# Patient Record
Sex: FEMALE | Race: WHITE | URBAN - METROPOLITAN AREA
[De-identification: names, ages, dates, MRNs, and addresses within clinical notes are randomized per-mention and may not be internally consistent; named-entity substitution may affect disease eponyms.]

---

## 2017-05-22 ENCOUNTER — DOCTOR'S OFFICE (OUTPATIENT)
Dept: URBAN - METROPOLITAN AREA CLINIC 137 | Facility: CLINIC | Age: 57
Setting detail: OPHTHALMOLOGY
End: 2017-05-22
Payer: COMMERCIAL

## 2017-05-22 ENCOUNTER — OPTICAL OFFICE (OUTPATIENT)
Dept: URBAN - METROPOLITAN AREA CLINIC 146 | Facility: CLINIC | Age: 57
Setting detail: OPHTHALMOLOGY
End: 2017-05-22
Payer: COMMERCIAL

## 2017-05-22 DIAGNOSIS — H52.4: ICD-10-CM

## 2017-05-22 DIAGNOSIS — H52.223: ICD-10-CM

## 2017-05-22 PROCEDURE — V2744 TINT PHOTOCHROMATIC LENS/ES: HCPCS | Performed by: OPHTHALMOLOGY

## 2017-05-22 PROCEDURE — V2203 LENS SPHCYL BIFOCAL 4.00D/.1: HCPCS | Performed by: OPHTHALMOLOGY

## 2017-05-22 PROCEDURE — V2020 VISION SVCS FRAMES PURCHASES: HCPCS | Performed by: OPHTHALMOLOGY

## 2017-05-22 PROCEDURE — 92004 COMPRE OPH EXAM NEW PT 1/>: CPT | Performed by: OPHTHALMOLOGY

## 2017-05-22 ASSESSMENT — REFRACTION_CURRENTRX
OS_OVR_VA: 20/
OS_OVR_VA: 20/
OS_ADD: +2.50
OD_AXIS: 45
OD_OVR_VA: 20/
OS_VPRISM_DIRECTION: BF
OS_SPHERE: +2.50
OS_AXIS: 135
OD_OVR_VA: 20/
OD_VPRISM_DIRECTION: BF
OD_CYLINDER: +1.50
OS_OVR_VA: 20/
OD_ADD: +2.50
OD_OVR_VA: 20/
OS_CYLINDER: +0.50
OD_SPHERE: +3.00

## 2017-05-22 ASSESSMENT — REFRACTION_MANIFEST
OD_VA2: 20/
OS_VA3: 20/
OS_VA2: 20/
OD_VA2: 20/
OS_VA1: 20/
OD_VA3: 20/
OD_VA1: 20/
OD_VA1: 20/
OS_VA3: 20/
OS_VA1: 20/
OD_VA3: 20/
OS_VA2: 20/
OU_VA: 20/
OU_VA: 20/

## 2017-05-22 ASSESSMENT — REFRACTION_OUTSIDERX
OS_ADD: +2.25
OD_VA2: 20/20(J1+)
OD_CYLINDER: +2.00
OD_SPHERE: +3.00
OS_VA2: 20/20(J1+)
OS_AXIS: 140
OD_ADD: +2.25
OD_VA3: 20/
OS_VA1: 20/20
OU_VA: 20/
OS_VA3: 20/
OD_AXIS: 45
OS_CYLINDER: +0.50
OS_SPHERE: +2.50
OD_VA1: 20/20

## 2017-05-22 ASSESSMENT — VISUAL ACUITY
OS_BCVA: 20/20-2
OD_BCVA: 20/20

## 2017-05-22 ASSESSMENT — REFRACTION_AUTOREFRACTION
OD_SPHERE: +3.00
OD_AXIS: 46
OS_SPHERE: +2.50
OD_CYLINDER: +2.00
OS_CYLINDER: +0.50
OS_AXIS: 143

## 2017-05-22 ASSESSMENT — SPHEQUIV_DERIVED
OS_SPHEQUIV: 2.75
OD_SPHEQUIV: 4

## 2017-05-22 ASSESSMENT — CONFRONTATIONAL VISUAL FIELD TEST (CVF)
OS_FINDINGS: FULL
OD_FINDINGS: FULL

## 2018-06-13 ENCOUNTER — OPTICAL OFFICE (OUTPATIENT)
Dept: URBAN - METROPOLITAN AREA CLINIC 146 | Facility: CLINIC | Age: 58
Setting detail: OPHTHALMOLOGY
End: 2018-06-13
Payer: COMMERCIAL

## 2018-06-13 ENCOUNTER — DOCTOR'S OFFICE (OUTPATIENT)
Dept: URBAN - METROPOLITAN AREA CLINIC 137 | Facility: CLINIC | Age: 58
Setting detail: OPHTHALMOLOGY
End: 2018-06-13
Payer: COMMERCIAL

## 2018-06-13 DIAGNOSIS — H52.223: ICD-10-CM

## 2018-06-13 DIAGNOSIS — H52.4: ICD-10-CM

## 2018-06-13 PROBLEM — H25.13 CATARACT NUCLEAR SCLEROSIS; BOTH EYES: Status: ACTIVE | Noted: 2018-06-13

## 2018-06-13 PROCEDURE — V2744 TINT PHOTOCHROMATIC LENS/ES: HCPCS | Performed by: OPHTHALMOLOGY

## 2018-06-13 PROCEDURE — V2203 LENS SPHCYL BIFOCAL 4.00D/.1: HCPCS | Performed by: OPHTHALMOLOGY

## 2018-06-13 PROCEDURE — 92014 COMPRE OPH EXAM EST PT 1/>: CPT | Performed by: OPHTHALMOLOGY

## 2018-06-13 PROCEDURE — V2020 VISION SVCS FRAMES PURCHASES: HCPCS | Performed by: OPHTHALMOLOGY

## 2018-06-13 ASSESSMENT — REFRACTION_OUTSIDERX
OS_VA2: 20/20(J1+)
OS_VA1: 20/20
OD_VA1: 20/20
OS_VA2: 20/20
OS_VA3: 20/
OD_AXIS: 040
OD_SPHERE: +3.00
OS_AXIS: 140
OS_ADD: +2.25
OD_VA3: 20/
OS_VA1: 20/20
OS_SPHERE: +2.50
OD_CYLINDER: +2.00
OS_CYLINDER: +0.50
OD_ADD: +2.25
OD_SPHERE: +3.00
OD_VA1: 20/20
OU_VA: 20/
OS_AXIS: 140
OS_CYLINDER: +0.50
OS_VA3: 20/
OD_ADD: +2.00
OS_SPHERE: +2.50
OD_AXIS: 45
OU_VA: 20/20
OD_VA2: 20/20(J1+)
OD_CYLINDER: +0.20
OS_ADD: +2.00
OD_VA2: 20/20
OD_VA3: 20/

## 2018-06-13 ASSESSMENT — REFRACTION_MANIFEST
OS_VA3: 20/
OD_VA1: 20/
OU_VA: 20/
OD_VA3: 20/
OD_VA2: 20/
OS_VA2: 20/
OS_VA1: 20/

## 2018-06-13 ASSESSMENT — CONFRONTATIONAL VISUAL FIELD TEST (CVF)
OS_FINDINGS: FULL
OD_FINDINGS: FULL

## 2018-06-14 ASSESSMENT — SPHEQUIV_DERIVED
OS_SPHEQUIV: 2.75
OD_SPHEQUIV: 4

## 2018-06-14 ASSESSMENT — REFRACTION_CURRENTRX
OS_OVR_VA: 20/
OS_OVR_VA: 20/
OD_OVR_VA: 20/
OS_VPRISM_DIRECTION: BF
OD_SPHERE: +3.00
OD_ADD: +2.50
OS_OVR_VA: 20/
OD_OVR_VA: 20/
OD_OVR_VA: 20/
OS_AXIS: 135
OD_VPRISM_DIRECTION: BF
OD_AXIS: 45
OS_SPHERE: +2.50
OS_ADD: +2.50
OS_CYLINDER: +0.50
OD_CYLINDER: +1.50

## 2018-06-14 ASSESSMENT — REFRACTION_AUTOREFRACTION
OD_SPHERE: +3.00
OS_CYLINDER: +0.50
OD_CYLINDER: +2.00
OS_SPHERE: +2.50
OS_AXIS: 142
OD_AXIS: 042

## 2018-06-14 ASSESSMENT — VISUAL ACUITY
OS_BCVA: 20/20
OD_BCVA: 20/20-1

## 2019-08-28 ENCOUNTER — TELEPHONE (OUTPATIENT)
Dept: FAMILY MEDICINE CLINIC | Facility: CLINIC | Age: 59
End: 2019-08-28

## 2019-08-28 ENCOUNTER — OFFICE VISIT (OUTPATIENT)
Dept: FAMILY MEDICINE CLINIC | Facility: CLINIC | Age: 59
End: 2019-08-28
Payer: COMMERCIAL

## 2019-08-28 VITALS
TEMPERATURE: 97.5 F | RESPIRATION RATE: 18 BRPM | SYSTOLIC BLOOD PRESSURE: 142 MMHG | WEIGHT: 233.2 LBS | DIASTOLIC BLOOD PRESSURE: 82 MMHG | BODY MASS INDEX: 36.52 KG/M2 | HEART RATE: 92 BPM

## 2019-08-28 DIAGNOSIS — Z13.1 SCREENING FOR DIABETES MELLITUS: ICD-10-CM

## 2019-08-28 DIAGNOSIS — H72.92 PERFORATION OF LEFT TYMPANIC MEMBRANE: Primary | ICD-10-CM

## 2019-08-28 DIAGNOSIS — Z13.220 SCREENING FOR LIPOID DISORDERS: ICD-10-CM

## 2019-08-28 PROBLEM — Z00.00 HEALTHCARE MAINTENANCE: Status: ACTIVE | Noted: 2019-08-28

## 2019-08-28 PROBLEM — H72.90 PERFORATED TYMPANIC MEMBRANE: Status: ACTIVE | Noted: 2019-08-28

## 2019-08-28 PROCEDURE — 3725F SCREEN DEPRESSION PERFORMED: CPT | Performed by: FAMILY MEDICINE

## 2019-08-28 PROCEDURE — 99214 OFFICE O/P EST MOD 30 MIN: CPT | Performed by: FAMILY MEDICINE

## 2019-08-28 PROCEDURE — 4004F PT TOBACCO SCREEN RCVD TLK: CPT | Performed by: FAMILY MEDICINE

## 2019-08-28 RX ORDER — ALBUTEROL SULFATE 90 UG/1
2 AEROSOL, METERED RESPIRATORY (INHALATION) EVERY 4 HOURS
Refills: 0 | COMMUNITY
Start: 2019-08-13

## 2019-08-28 RX ORDER — NAPROXEN 500 MG/1
500 TABLET ORAL 2 TIMES DAILY WITH MEALS
Qty: 14 TABLET | Refills: 0 | Status: SHIPPED | OUTPATIENT
Start: 2019-08-28 | End: 2020-07-06

## 2019-08-28 RX ORDER — OXYCODONE HYDROCHLORIDE AND ACETAMINOPHEN 5; 325 MG/1; MG/1
TABLET ORAL
Refills: 0 | COMMUNITY
Start: 2019-08-24 | End: 2020-07-06

## 2019-08-28 RX ORDER — AMOXICILLIN 500 MG/1
500 TABLET, FILM COATED ORAL 3 TIMES DAILY
Refills: 0 | COMMUNITY
Start: 2019-08-24 | End: 2020-07-06

## 2019-08-28 RX ORDER — NAPROXEN 500 MG/1
500 TABLET ORAL 2 TIMES DAILY WITH MEALS
Qty: 14 TABLET | Refills: 0 | Status: SHIPPED | OUTPATIENT
Start: 2019-08-28 | End: 2019-08-28 | Stop reason: SDUPTHER

## 2019-08-28 RX ORDER — DIAZEPAM 10 MG/1
TABLET ORAL
Refills: 0 | COMMUNITY
Start: 2019-08-04 | End: 2020-07-06

## 2019-08-28 RX ORDER — TRAMADOL HYDROCHLORIDE 50 MG/1
50 TABLET ORAL EVERY 6 HOURS
Refills: 0 | COMMUNITY
Start: 2019-08-20 | End: 2020-07-06

## 2019-08-28 NOTE — TELEPHONE ENCOUNTER
Patient called office stating she is not satisfied with medication for inflammation  Patient stated she is having Tramadol filled by [facility] and that she will be going to [facility] ER for the pain
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
## 2019-08-28 NOTE — ASSESSMENT & PLAN NOTE
Left tympanic membrane ruptured  Would like to have refill of her oxycodone and diazepam      Discussed option of using a steroid to help with the inflammation seen on otoscopy  States she does not want prednisone because it made her sick last time  Discussed using Naproxen 500mg and follow up with ENT regarding repair  Patient refused naproxen because she says it will not do anything  Advised patient to fill the prescription and follow up with ENT since narcotics should not be used in her treatment today  Naproxen 500mg and ENT referral given today      RTC in 2 weeks

## 2019-08-29 NOTE — TELEPHONE ENCOUNTER
Patient called requesting to speak with Dr Chris Carreon or Dr Natasha Sarah  Patient called stating that she wanted to thank the provider for referring her to ENT  Patient stated that she is "doing her homework"  Patient went to ER last night and was given 6 tablets of 5mg Valium  Patient stated that she was given referral in the ER for chronic pain physical therapy which she stated she will call and schedule today  Called [facility] and was given a name for another PCP that she will be establishing with next week as we are not enrolled in [facility] right now, but if she does not like that provider she will be coming back to our office  Patient stated that she is "shopping around" for a provider  Patient states she was "pissed off after her appointment yesterday because she was not given a couple tablets for her muscle spasms"  Patient stated that she does understands that they were only doing their job

## 2019-09-03 ENCOUNTER — TELEPHONE (OUTPATIENT)
Dept: FAMILY MEDICINE CLINIC | Facility: CLINIC | Age: 59
End: 2019-09-03

## 2019-09-03 NOTE — TELEPHONE ENCOUNTER
Spoke with [facility]  They stated that they need to call the ENT office to see if they could add on to their schedule  They will call back if there is an availability

## 2019-09-03 NOTE — TELEPHONE ENCOUNTER
Spoke with Anne From the [facility]  Patient is scheduled 9/12/19 at 140pm  Patient is asked to arrive 15 minutes early and to bring her insurance card and photo ID  Left voicemail for patient informing her of the appointment

## 2019-09-03 NOTE — TELEPHONE ENCOUNTER
Patient called stating that she called the [facility] [address] ENT  Patient stated she called at 10:43am on 9/3 and spoke with Mayco Paniagua  Patient stated she was told earliest visit is Friday December 12th with Dr Lisette Beltran  Patient called to see if there was anywhere else we could refer her to or if we can try to get her in earlier  Please advise  Thank you

## 2019-09-03 NOTE — TELEPHONE ENCOUNTER
Patient called in stated she would like us to fax all paperwork to [facility] attn: Kortney Cazares [facility]#[phone] advised in order for us to release information to this facility will need her to sign medical release form

## 2019-09-09 ENCOUNTER — TRANSCRIBE ORDERS (OUTPATIENT)
Dept: INTERNAL MEDICINE CLINIC | Facility: CLINIC | Age: 59
End: 2019-09-09

## 2019-09-09 DIAGNOSIS — H72.12 TYMPANIC MEMBRANE ATTIC PERFORATION, LEFT: Primary | ICD-10-CM

## 2019-09-11 ENCOUNTER — OFFICE VISIT (OUTPATIENT)
Dept: FAMILY MEDICINE CLINIC | Facility: CLINIC | Age: 59
End: 2019-09-11
Payer: COMMERCIAL

## 2019-09-11 VITALS
WEIGHT: 229.8 LBS | TEMPERATURE: 98.1 F | SYSTOLIC BLOOD PRESSURE: 126 MMHG | HEART RATE: 100 BPM | DIASTOLIC BLOOD PRESSURE: 80 MMHG | BODY MASS INDEX: 35.99 KG/M2

## 2019-09-11 DIAGNOSIS — H92.01 RIGHT EAR PAIN: ICD-10-CM

## 2019-09-11 DIAGNOSIS — F17.200 TOBACCO USE DISORDER: ICD-10-CM

## 2019-09-11 DIAGNOSIS — Z71.6 ENCOUNTER FOR TOBACCO USE CESSATION COUNSELING: Primary | ICD-10-CM

## 2019-09-11 PROBLEM — Z71.1 WORRIED WELL: Status: ACTIVE | Noted: 2019-09-11

## 2019-09-11 PROBLEM — H92.09 EAR PAIN: Status: ACTIVE | Noted: 2019-09-11

## 2019-09-11 PROCEDURE — 99214 OFFICE O/P EST MOD 30 MIN: CPT | Performed by: FAMILY MEDICINE

## 2019-09-11 NOTE — ASSESSMENT & PLAN NOTE
Concerned about exposure to Tuberculosis  Patient had a 5-10 minute conversation with a person who said she had tuberculosis  No symptoms or complaints consistent with TB infection  Advised patient she was not exposed to TB long enough to contract disease and to follow up with PCP if symptoms occur

## 2019-09-11 NOTE — ASSESSMENT & PLAN NOTE
Patient has pruritis and dryness of external right ear  Uses Q-Tips to relieve her symptoms  Patient has appointment with ENT 9/12/2019 for a perforated left tympanic membrane which occurred 8/2019  External ear pruritis vs  Xeroderma of external ear    Advised patient against Q-Tip use, and to present her symptoms to ENT during her visit tomorrow

## 2019-09-11 NOTE — ASSESSMENT & PLAN NOTE
Patient is interested in smoking cessation  States she smokes about a half a pack per day for the past 20 years  Recommended patient to find ways to avoid situations where she may feel the need to use tobacco  Advised against using a vaporizer as an aid to stop smoking  Patient is interested in NRT and is willing to try a patch and gum  Prescribed Nicotine Patch 7mg & Nicotine gum 2mg PRN      RTC in 3 months

## 2019-09-11 NOTE — PROGRESS NOTES
Family Medicine Follow-Up Office Visit  Emmanuel Milan 63 y o  female   MRN: [id number] : 1960  ENCOUNTER: 2019 5:59 PM    Assessment and Plan   Ear pain  Patient has pruritis and dryness of external right ear  Uses Q-Tips to relieve her symptoms  Patient has appointment with ENT 2019 for a perforated left tympanic membrane which occurred 2019  External ear pruritis vs  Xeroderma of external ear    Advised patient against Q-Tip use, and to present her symptoms to ENT during her visit tomorrow  Worried well  Concerned about exposure to Tuberculosis  Patient had a 5-10 minute conversation with a person who said she had tuberculosis  No symptoms or complaints consistent with TB infection  Advised patient she was not exposed to TB long enough to contract disease and to follow up with PCP if symptoms occur  Tobacco use disorder  Patient is interested in smoking cessation  States she smokes about a half a pack per day for the past 20 years  Recommended patient to find ways to avoid situations where she may feel the need to use tobacco  Advised against using a vaporizer as an aid to stop smoking  Patient is interested in NRT and is willing to try a patch and gum  Prescribed Nicotine Patch 7mg & Nicotine gum 2mg PRN  RTC in 3 months      Chief Complaint     Chief Complaint   Patient presents with    Follow-up       History of Present Illness   Emmanuel Milan is a 63y o -year-old female who presents today for right ear pain  States for the past 3 days, she has had itching of the right ear  Uses Q-tips to relieve the itching  Says she also has dryness of the right ear  Has an appoint with ENT regarding a perforated left tympanic membrane on 2019  Patient also states she had a brief conversation with a patient who said she had tuberculosis and is worried about cira the disease, however, she has no symptoms  Also interested in tobacco cessation      Review of Systems Review of Systems   Constitutional: Negative for activity change, appetite change, chills, fatigue and fever  HENT: Positive for ear pain (left sided perforation)  Negative for congestion, facial swelling, sinus pressure and sinus pain  Eyes: Negative for pain, discharge, redness and itching  Respiratory: Negative for apnea, cough, chest tightness, shortness of breath and wheezing  Gastrointestinal: Negative for abdominal distention, abdominal pain, constipation and diarrhea  Genitourinary: Negative for difficulty urinating, dysuria, flank pain and frequency  Neurological: Negative for dizziness, weakness, numbness and headaches  Active Problem List     Patient Active Problem List   Diagnosis    Perforated tympanic membrane    Healthcare maintenance    Ear pain    Tobacco use disorder    Worried well       Past Medical History, Past Surgical History, Family History, and Social History were reviewed and updated today as appropriate  Objective   /80   Pulse 100   Temp 98 1 °F (36 7 °C)   Wt 104 kg (229 lb 12 8 oz)   BMI 35 99 kg/m²     Physical Exam   Constitutional: She appears well-developed and well-nourished  No distress  HENT:   Head: Normocephalic and atraumatic  Right Ear: External ear normal    Left Ear: External ear normal    Nose: Nose normal    Mouth/Throat: Oropharynx is clear and moist    Cardiovascular: Normal rate, regular rhythm, normal heart sounds and intact distal pulses  No murmur heard  Pulmonary/Chest: Effort normal and breath sounds normal  No respiratory distress  She has no wheezes  Abdominal: Soft  She exhibits no distension  There is no tenderness  Skin: Skin is warm and dry  She is not diaphoretic  No erythema  No pallor       Diabetic Foot Exam    Pertinent Laboratory/Diagnostic Studies:  Lab Results   Component Value Date    GLUCOSE 86 05/23/2014    BUN 11 09/22/2016    CREATININE 0 89 09/22/2016    CALCIUM 9 2 09/22/2016     05/23/2014    K 4 0 09/22/2016    CO2 28 09/22/2016     09/22/2016     Lab Results   Component Value Date    ALT 41 09/22/2016    AST 20 09/22/2016    ALKPHOS 125 (H) 09/22/2016    BILITOT 0 3 05/23/2014       Lab Results   Component Value Date    WBC 11 25 (H) 09/22/2016    HGB 16 1 (H) 09/22/2016    HCT 47 1 (H) 09/22/2016    MCV 94 09/22/2016     09/22/2016       No results found for: TSH    Lab Results   Component Value Date    CHOL 195 05/23/2014     Lab Results   Component Value Date    TRIG 148 09/22/2016     Lab Results   Component Value Date    HDL 61 (H) 09/22/2016     Lab Results   Component Value Date    LDLCALC 159 (H) 09/22/2016     No results found for: HGBA1C    Results for orders placed or performed in visit on 09/22/16   CBC and differential   Result Value Ref Range    WBC 11 25 (H) 4 31 - 10 16 Thousand/uL    RBC 5 02 3 81 - 5 12 Million/uL    Hemoglobin 16 1 (H) 11 5 - 15 4 g/dL    Hematocrit 47 1 (H) 34 8 - 46 1 %    MCV 94 82 - 98 fL    MCH 32 1 26 8 - 34 3 pg    MCHC 34 2 31 4 - 37 4 g/dL    RDW 14 3 11 6 - 15 1 %    MPV 9 7 8 9 - 12 7 fL    Platelets 905 452 - 983 Thousands/uL    Neutrophils Relative 70 43 - 75 %    Lymphocytes Relative 21 14 - 44 %    Monocytes Relative 6 4 - 12 %    Eosinophils Relative 3 0 - 6 %    Basophils Relative 0 0 - 1 %    Neutrophils Absolute 7 87 (H) 1 85 - 7 62 Thousands/µL    Lymphocytes Absolute 2 37 0 60 - 4 47 Thousands/µL    Monocytes Absolute 0 66 0 17 - 1 22 Thousand/µL    Eosinophils Absolute 0 33 0 00 - 0 61 Thousand/µL    Basophils Absolute 0 02 0 00 - 0 10 Thousands/µL   Comprehensive metabolic panel   Result Value Ref Range    Sodium 142 136 - 145 mmol/L    Potassium 4 0 3 5 - 5 3 mmol/L    Chloride 104 100 - 108 mmol/L    CO2 28 21 - 32 mmol/L    ANION GAP 10 4 - 13 mmol/L    BUN 11 5 - 25 mg/dL    Creatinine 0 89 0 60 - 1 30 mg/dL    Glucose 98 65 - 140 mg/dL    Calcium 9 2 8 3 - 10 1 mg/dL    AST 20 5 - 45 U/L    ALT 41 12 - 78 U/L Alkaline Phosphatase 125 (H) 46 - 116 U/L    Total Protein 8 2 6 4 - 8 2 g/dL    Albumin 4 1 3 5 - 5 0 g/dL    Total Bilirubin 0 30 0 20 - 1 00 mg/dL    eGFR >60 0 ml/min/1 73sq m   Lipid panel   Result Value Ref Range    Cholesterol 250 (H) 50 - 200 mg/dL    Triglycerides 148 <=150 mg/dL    HDL, Direct 61 (H) 40 - 60 mg/dL    LDL Calculated 159 (H) 0 - 100 mg/dL   TSH, 3rd generation with T4 reflex   Result Value Ref Range    TSH 3RD GENERATON 1 699 0 358 - 3 740 uIU/mL       No orders of the defined types were placed in this encounter  Current Medications     Current Outpatient Medications   Medication Sig Dispense Refill    naproxen (NAPROSYN) 500 mg tablet Take 1 tablet (500 mg total) by mouth 2 (two) times a day with meals 14 tablet 0    albuterol (PROVENTIL HFA,VENTOLIN HFA) 90 mcg/act inhaler Inhale 2 puffs every 4 (four) hours  0    amoxicillin (AMOXIL) 500 MG tablet Take 500 mg by mouth 3 (three) times a day  0    diazepam (VALIUM) 10 mg tablet TK 1 T PO TID  0    nicotine (NICODERM CQ) 7 mg/24hr TD 24 hr patch Place 1 patch on the skin every 24 hours 14 patch 2    nicotine polacrilex (NICORETTE) 2 mg gum Chew 1 each (2 mg total) as needed for smoking cessation 50 each 2    oxyCODONE-acetaminophen (PERCOCET) 5-325 mg per tablet take 1 tablet by mouth every 6 hours if needed for back pain  0    traMADol (ULTRAM) 50 mg tablet Take 50 mg by mouth every 6 (six) hours  0     No current facility-administered medications for this visit          ALLERGIES:  No Known Allergies    Health Maintenance     Health Maintenance   Topic Date Due    Hepatitis C Screening  1960    MAMMOGRAM  1960    CRC Screening: Colonoscopy  1960    Pneumococcal Vaccine: Pediatrics (0 to 5 Years) and At-Risk Patients (6 to 59 Years) (1 of 1 - PPSV23) 09/09/1967    DTaP,Tdap,and Td Vaccines (1 - Tdap) 09/09/1982    PAP SMEAR  09/09/1982    INFLUENZA VACCINE  07/01/2019    Depression Screening PHQ 08/28/2020    BMI: Adult  08/28/2020    Pneumococcal Vaccine: 65+ Years (1 of 2 - PCV13) 09/09/2026    HEPATITIS B VACCINES  Aged Out       There is no immunization history on file for this patient  Alicia Zarate MD   [address]  9/11/2019  5:59 PM    Parts of this note were dictated using [employer] dictation software and may have sounds-like errors due to variation in pronunciation

## 2019-09-12 ENCOUNTER — TELEPHONE (OUTPATIENT)
Dept: FAMILY MEDICINE CLINIC | Facility: CLINIC | Age: 59
End: 2019-09-12

## 2019-09-12 NOTE — TELEPHONE ENCOUNTER
Called patient back stated she just contacted [facility] she was told they will e-mail her a list of providers she is able to see   Advised to call us back with that information and we will forward information to Dr Bernabe Closs to be able to issue new referral

## 2019-09-12 NOTE — TELEPHONE ENCOUNTER
Received phone call from patient stating she cancelled appointment for ENT for 9/12 at 140PM patient stated she does not feel comfortable going to appointment, [facility] would like to see if she is able to see another provider within the network in a regular doctor's office environment  Patient also asked what other medicaid insurances we accept advised we also accept [facility]   Please call patient back at [phone]

## 2019-09-12 NOTE — TELEPHONE ENCOUNTER
Patient should try to contact her insurance company and find out which providers she is able to see   Please try calling patient back at some point today

## 2019-09-13 ENCOUNTER — TELEPHONE (OUTPATIENT)
Dept: FAMILY MEDICINE CLINIC | Facility: CLINIC | Age: 59
End: 2019-09-13

## 2019-09-13 NOTE — TELEPHONE ENCOUNTER
Pharmacy called regarding the prescription for the nicotine polacrilex (nicorette)   Pharmacy requests clear directions for usage

## 2019-09-13 NOTE — TELEPHONE ENCOUNTER
Spoke with pharmacy  Updated prescription to chew one 2mg piece as needed, maximum 5 pieces per day   Dispense box of 50 pieces, 2 refills

## 2019-11-21 DIAGNOSIS — H72.92 PERFORATION OF LEFT TYMPANIC MEMBRANE: ICD-10-CM

## 2019-12-04 RX ORDER — NAPROXEN 500 MG/1
TABLET ORAL
Qty: 14 TABLET | Refills: 0 | Status: SHIPPED | OUTPATIENT
Start: 2019-12-04 | End: 2020-07-06

## 2019-12-04 NOTE — TELEPHONE ENCOUNTER
Please reach out to patient and ask her to go for blood work previously ordered   Refill approved at this time

## 2020-02-20 ENCOUNTER — TELEPHONE (OUTPATIENT)
Dept: FAMILY MEDICINE CLINIC | Facility: CLINIC | Age: 60
End: 2020-02-20

## 2020-02-20 NOTE — TELEPHONE ENCOUNTER
From in preceptor room folder from [place] [employer] and delinqu[employer] to be completed  Thank you

## 2020-03-03 ENCOUNTER — OFFICE VISIT (OUTPATIENT)
Dept: FAMILY MEDICINE CLINIC | Facility: CLINIC | Age: 60
End: 2020-03-03
Payer: COMMERCIAL

## 2020-03-03 VITALS
TEMPERATURE: 97.7 F | SYSTOLIC BLOOD PRESSURE: 122 MMHG | BODY MASS INDEX: 36.56 KG/M2 | HEART RATE: 101 BPM | DIASTOLIC BLOOD PRESSURE: 84 MMHG | OXYGEN SATURATION: 98 % | WEIGHT: 233.4 LBS

## 2020-03-03 DIAGNOSIS — F17.200 TOBACCO USE DISORDER: ICD-10-CM

## 2020-03-03 DIAGNOSIS — G47.00 INSOMNIA, UNSPECIFIED TYPE: ICD-10-CM

## 2020-03-03 DIAGNOSIS — H72.92 PERFORATION OF LEFT TYMPANIC MEMBRANE: Primary | ICD-10-CM

## 2020-03-03 PROCEDURE — 99214 OFFICE O/P EST MOD 30 MIN: CPT | Performed by: FAMILY MEDICINE

## 2020-03-03 PROCEDURE — 4004F PT TOBACCO SCREEN RCVD TLK: CPT | Performed by: FAMILY MEDICINE
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
## 2020-03-03 NOTE — ASSESSMENT & PLAN NOTE
Patient was non compliant with nicotine replacement therapy ordered at last visit  States she does not want to quit at this moment   Maybe in the future    - Will discuss tobacco cessation at next visit    RTC after ENT visit

## 2020-03-04 PROBLEM — G47.00 INSOMNIA: Status: ACTIVE | Noted: 2020-03-04

## 2020-03-04 NOTE — ASSESSMENT & PLAN NOTE
Patient states she has had difficulty sleeping for the past few months  No improvement from tylenol PM  Slight improvement with camomille tea      - Recommended sleep hygiene  - Recommended valerian root to help with sleep

## 2020-03-05 ENCOUNTER — TELEPHONE (OUTPATIENT)
Dept: FAMILY MEDICINE CLINIC | Facility: CLINIC | Age: 60
End: 2020-03-05

## 2020-03-24 ENCOUNTER — TELEPHONE (OUTPATIENT)
Dept: FAMILY MEDICINE CLINIC | Facility: CLINIC | Age: 60
End: 2020-03-24

## 2020-03-24 NOTE — TELEPHONE ENCOUNTER
Do you have a fever of 100 5 and above?:  Do you have a cough?:  Do you have shortness of breath?:    Have you visited [place]?:  [facility]?:  [place]?:  [place]  [place]?:    Have you visited [place]?:  [place]?:  [place]?:    Have you visited [place]:?   [address]?:  [address] [address] [facility]?:  [facility]?:    Have you been exposed to someone who has been diagnosed with coronavirus?:    Do you commute daily to and from [place] or [place] for work?:

## 2020-04-08 ENCOUNTER — TELEPHONE (OUTPATIENT)
Dept: FAMILY MEDICINE CLINIC | Facility: CLINIC | Age: 60
End: 2020-04-08

## 2020-07-06 ENCOUNTER — HOSPITAL ENCOUNTER (EMERGENCY)
Facility: HOSPITAL | Age: 60
Discharge: HOME | End: 2020-07-06
Attending: EMERGENCY MEDICINE
Payer: COMMERCIAL

## 2020-07-06 VITALS
WEIGHT: 234.56 LBS | DIASTOLIC BLOOD PRESSURE: 75 MMHG | BODY MASS INDEX: 36.74 KG/M2 | TEMPERATURE: 98.9 F | SYSTOLIC BLOOD PRESSURE: 142 MMHG | OXYGEN SATURATION: 97 % | RESPIRATION RATE: 16 BRPM | HEART RATE: 99 BPM

## 2020-07-06 DIAGNOSIS — H92.01 RIGHT EAR PAIN: ICD-10-CM

## 2020-07-06 DIAGNOSIS — K08.89 PAIN, DENTAL: Primary | ICD-10-CM

## 2020-07-06 DIAGNOSIS — H92.01 OTALGIA OF RIGHT EAR: ICD-10-CM

## 2020-07-06 PROCEDURE — 99284 EMERGENCY DEPT VISIT MOD MDM: CPT | Performed by: PHYSICIAN ASSISTANT

## 2020-07-06 PROCEDURE — 99282 EMERGENCY DEPT VISIT SF MDM: CPT

## 2020-07-06 RX ORDER — PENICILLIN V POTASSIUM 500 MG/1
1000 TABLET ORAL 2 TIMES DAILY
Qty: 40 TABLET | Refills: 0 | Status: SHIPPED | OUTPATIENT
Start: 2020-07-06 | End: 2020-07-16

## 2020-07-06 RX ORDER — OXYCODONE HYDROCHLORIDE AND ACETAMINOPHEN 5; 325 MG/1; MG/1
1 TABLET ORAL EVERY 6 HOURS PRN
Qty: 12 TABLET | Refills: 0 | Status: SHIPPED | OUTPATIENT
Start: 2020-07-06 | End: 2020-07-09

## 2020-07-06 NOTE — ED PROVIDER NOTES
History  Chief Complaint   Patient presents with    Dental Pain     Right lower wisdom tooth impaction, has not seen dentist due to [place]  Reports taking motrin and tumeric   Earache     Right ear     Patient history of anxiety, depression, hearing loss from left perforated TM, has been going to ENT for ear issues, presents to ED complaining of right lower jaw pain from a impacted wisdom tooth with radiation into the ear that she is concerned she may have an ear infection  Patient has been using the drops that she was using for the left ear, no fever, no discharge, no facial swelling, no vomiting  Patient states she tried calling dentist but there was a wait list to came to emergency department, because they said they would not see her until the infection is cleared          Prior to Admission Medications   Prescriptions Last Dose Informant Patient Reported? Taking? albuterol (PROVENTIL HFA,VENTOLIN HFA) 90 mcg/act inhaler  Self Yes No   Sig: Inhale 2 puffs every 4 (four) hours   nicotine (NICODERM CQ) 7 mg/24hr TD 24 hr patch   No No   Sig: Place 1 patch on the skin every 24 hours      Facility-Administered Medications: None       Past Medical History:   Diagnosis Date    Anxiety     Depression     HL (hearing loss)        Past Surgical History:   Procedure Laterality Date     SECTION      DENTAL SURGERY N/A /       Family History   Problem Relation Age of Onset    Diabetes Mother     Hypertension Mother     Cancer Father     Diabetes Father     Hypertension Father     Hypertension Family     Cancer Family     Mental illness Family      I have reviewed and agree with the history as documented      E-Cigarette/Vaping     E-Cigarette/Vaping Substances     Social History     Tobacco Use    Smoking status: Current Every Day Smoker     Packs/day: 1 00     Years: 20 00     Pack years: 20 00     Types: Cigarettes    Smokeless tobacco: Current User    Tobacco comment: Patient states she has been smoking on and off for 20 years   Substance Use Topics    Alcohol use: Not Currently     Frequency: Never     Binge frequency: Never    Drug use: Not Currently       Review of Systems   Constitutional: Negative for fever  HENT: Positive for dental problem and ear pain  Negative for hearing loss and sore throat  Eyes: Negative for visual disturbance  Respiratory: Negative for cough and shortness of breath  Cardiovascular: Negative for chest pain and leg swelling  Gastrointestinal: Negative for abdominal pain and vomiting  Genitourinary: Negative for dysuria and frequency  Musculoskeletal: Negative for arthralgias and myalgias  Skin: Negative for color change and pallor  Neurological: Negative for dizziness and weakness  Psychiatric/Behavioral: Negative for behavioral problems  Physical Exam  Physical Exam   Constitutional: She is oriented to person, place, and time  She appears well-developed and well-nourished  HENT:   Head: Normocephalic and atraumatic  Right Ear: External ear normal    Left Ear: External ear normal    Mouth/Throat: Oropharynx is clear and moist    Mild tenderness to gum over tooth number 32 which is not present, no facial swelling, no gingival  swelling   Eyes: Conjunctivae are normal    Neck: Normal range of motion  Cardiovascular: Normal rate and regular rhythm  Pulmonary/Chest: Effort normal and breath sounds normal    Abdominal: Soft  Bowel sounds are normal    Musculoskeletal: Normal range of motion  Neurological: She is alert and oriented to person, place, and time  Skin: Skin is warm and dry  Psychiatric: She has a normal mood and affect  Her behavior is normal    Nursing note and vitals reviewed        Vital Signs  ED Triage Vitals [07/06/20 1103]   Temperature Pulse Respirations Blood Pressure SpO2   98 9 °F (37 2 °C) 99 16 142/75 97 %      Temp Source Heart Rate Source Patient Position - Orthostatic VS BP Location FiO2 (%) Tympanic Monitor Sitting Left arm --      Pain Score       8           Vitals:    07/06/20 1103   BP: 142/75   Pulse: 99   Patient Position - Orthostatic VS: Sitting         Visual Acuity      ED Medications  Medications - No data to display    Diagnostic Studies  Results Reviewed     None                 No orders to display              Procedures  Procedures         ED Course                                             MDM  Number of Diagnoses or Management Options  Diagnosis management comments: Simple dental pain no signs of abscess or infection otalgia with good follow up will treat for pain and antibiotics and have patient follow-up with Oral maxillofacial and ENT        Disposition  Final diagnoses:   Pain, dental   Otalgia of right ear   Right ear pain     Time reflects when diagnosis was documented in both MDM as applicable and the Disposition within this note     Time User Action Codes Description Comment    7/6/2020 11:36 AM Catrachita Fu Add [K08 89] Pain, dental     7/6/2020 11:36 AM Catrachita Fu Add [H92 01] Otalgia of right ear     7/6/2020 11:37 AM Catrachita Fu Add [H92 01] Right ear pain       ED Disposition     ED Disposition Condition Date/Time Comment    Discharge Stable Mon Jul 6, 2020 11:36 AM Tiffany Cardenas discharge to home/self care              Follow-up Information     Follow up With Specialties Details Why Contact Info    Your ENT   As needed     [facility] [address]   As needed [address] [address] [phone]          Patient's Medications   Discharge Prescriptions    OXYCODONE-ACETAMINOPHEN (PERCOCET) 5-325 MG PER TABLET    Take 1 tablet by mouth every 6 (six) hours as needed for moderate pain for up to 3 daysMax Daily Amount: 4 tablets       Start Date: 7/6/2020  End Date: 7/9/2020       Order Dose: 1 tablet       Quantity: 12 tablet    Refills: 0    PENICILLIN V POTASSIUM (VEETID) 500 MG TABLET    Take 2 tablets (1,000 mg total) by mouth 2 (two) times a day for 10 days       Start Date: 7/6/2020  End Date: 7/16/2020       Order Dose: 1,000 mg       Quantity: 40 tablet    Refills: 0     No discharge procedures on file      PDMP Review     None          ED Provider  Electronically Signed by           Italo Stout PA-C  07/06/20 8040